# Patient Record
Sex: MALE | Race: WHITE | Employment: STUDENT | URBAN - METROPOLITAN AREA
[De-identification: names, ages, dates, MRNs, and addresses within clinical notes are randomized per-mention and may not be internally consistent; named-entity substitution may affect disease eponyms.]

---

## 2018-09-16 ENCOUNTER — HOSPITAL ENCOUNTER (EMERGENCY)
Facility: HOSPITAL | Age: 18
Discharge: HOME | End: 2018-09-16
Attending: EMERGENCY MEDICINE
Payer: OTHER MISCELLANEOUS

## 2018-09-16 VITALS
OXYGEN SATURATION: 98 % | RESPIRATION RATE: 16 BRPM | HEART RATE: 75 BPM | TEMPERATURE: 99 F | SYSTOLIC BLOOD PRESSURE: 111 MMHG | WEIGHT: 134.94 LBS | HEIGHT: 71 IN | DIASTOLIC BLOOD PRESSURE: 66 MMHG | BODY MASS INDEX: 18.89 KG/M2

## 2018-09-16 DIAGNOSIS — S61.412A LACERATION OF LEFT HAND WITHOUT FOREIGN BODY, INITIAL ENCOUNTER: Primary | ICD-10-CM

## 2018-09-16 PROCEDURE — 12002 RPR S/N/AX/GEN/TRNK2.6-7.5CM: CPT

## 2018-09-16 PROCEDURE — 99283 EMERGENCY DEPT VISIT LOW MDM: CPT

## 2018-09-16 PROCEDURE — 99282 EMERGENCY DEPT VISIT SF MDM: CPT

## 2018-09-17 NOTE — ED INITIAL ASSESSMENT (HPI)
Lac to left hand after he accidentally cut it on a clean knife at work. Works for [employer]

## 2018-09-17 NOTE — ED PROVIDER NOTES
Patient Seen in: [facility] Emergency Department    History   Patient presents with:  Laceration Abrasion (integumentary)    Stated Complaint: hand lac with knife    HPI    This is a 53-year-old male complaining of a left hand laceration that he did Muscle belly is exposed, but there is no laceration of the muscle. CMS exam is normal.  SKIN: Well perfused, without cyanosis. No rashes. NEUROLOGIC: Cranial nerves II through XII are intact moving all extremities normally.   No focal deficits visualized

## 2025-01-09 ENCOUNTER — APPOINTMENT (OUTPATIENT)
Dept: URGENT CARE | Age: 25
End: 2025-01-09

## 2025-01-09 ENCOUNTER — HOSPITAL ENCOUNTER (EMERGENCY)
Facility: HOSPITAL | Age: 25
Discharge: HOME | End: 2025-01-09
Attending: STUDENT IN AN ORGANIZED HEALTH CARE EDUCATION/TRAINING PROGRAM
Payer: COMMERCIAL

## 2025-01-09 VITALS
DIASTOLIC BLOOD PRESSURE: 59 MMHG | RESPIRATION RATE: 18 BRPM | OXYGEN SATURATION: 98 % | WEIGHT: 148 LBS | SYSTOLIC BLOOD PRESSURE: 127 MMHG | BODY MASS INDEX: 20.05 KG/M2 | HEIGHT: 72 IN | HEART RATE: 84 BPM | TEMPERATURE: 99 F

## 2025-01-09 DIAGNOSIS — S61.216A LACERATION OF RIGHT LITTLE FINGER WITHOUT FOREIGN BODY WITHOUT DAMAGE TO NAIL, INITIAL ENCOUNTER: ICD-10-CM

## 2025-01-09 DIAGNOSIS — S61.219A FINGER LACERATION INVOLVING TENDON, INITIAL ENCOUNTER: Primary | ICD-10-CM

## 2025-01-09 DIAGNOSIS — S61.210A LACERATION OF RIGHT INDEX FINGER WITHOUT FOREIGN BODY WITHOUT DAMAGE TO NAIL, INITIAL ENCOUNTER: ICD-10-CM

## 2025-01-09 DIAGNOSIS — S61.214A LACERATION OF RIGHT RING FINGER WITHOUT FOREIGN BODY WITHOUT DAMAGE TO NAIL, INITIAL ENCOUNTER: ICD-10-CM

## 2025-01-09 PROCEDURE — 12004 RPR S/N/AX/GEN/TRK7.6-12.5CM: CPT

## 2025-01-09 PROCEDURE — 99284 EMERGENCY DEPT VISIT MOD MDM: CPT

## 2025-01-09 PROCEDURE — 99283 EMERGENCY DEPT VISIT LOW MDM: CPT

## 2025-01-09 RX ORDER — LIDOCAINE HYDROCHLORIDE 10 MG/ML
1 INJECTION, SOLUTION INFILTRATION; PERINEURAL ONCE
Status: COMPLETED | OUTPATIENT
Start: 2025-01-09 | End: 2025-01-09

## 2025-01-09 RX ORDER — CEFDINIR 300 MG/1
300 CAPSULE ORAL ONCE
Status: COMPLETED | OUTPATIENT
Start: 2025-01-09 | End: 2025-01-09

## 2025-01-09 RX ORDER — ACETAMINOPHEN 500 MG
1000 TABLET ORAL ONCE
Status: COMPLETED | OUTPATIENT
Start: 2025-01-09 | End: 2025-01-09

## 2025-01-09 RX ORDER — LIDOCAINE HYDROCHLORIDE 10 MG/ML
INJECTION, SOLUTION INFILTRATION; PERINEURAL
Status: COMPLETED
Start: 2025-01-09 | End: 2025-01-09

## 2025-01-09 RX ORDER — CEFDINIR 300 MG/1
300 CAPSULE ORAL 2 TIMES DAILY
Qty: 14 CAPSULE | Refills: 0 | Status: SHIPPED | OUTPATIENT
Start: 2025-01-09 | End: 2025-01-16

## 2025-01-09 RX ORDER — CEPHALEXIN 500 MG/1
500 CAPSULE ORAL ONCE
Status: DISCONTINUED | OUTPATIENT
Start: 2025-01-09 | End: 2025-01-09

## 2025-01-09 NOTE — ED INITIAL ASSESSMENT (HPI)
Pt to ER with complaints of lac on r 2-5th fingers. Patient arrives wih dressing. Bleeding controlled. Unknown tetanus

## 2025-01-09 NOTE — ED PROVIDER NOTES
Patient Seen in: [facility] Emergency Department      History     Chief Complaint   Patient presents with    Laceration/Abrasion     Stated Complaint: laceration to hand, dressing in place, bleeding appears controlled    Subjective:   HPI    Patient is a 24-year-old male presenting the emergency department after having sustained 4 lacerations to his right hand.  Patient was using a sharp serrated bread knife to cut a piece of bread when the knife slipped injuring the palmar aspect of his second third fourth and fifth finger.  Patient unable to bend the middle finger.  No loss of sensation.    Objective:   Past Medical History:    Spontaneous pneumothorax    2015              Past Surgical History:   Procedure Laterality Date    Insertion of chest tube      12/2015 ( and removal)                Social History     Socioeconomic History    Marital status: Single   Tobacco Use    Smoking status: Never    Smokeless tobacco: Never   Substance and Sexual Activity    Drug use: Yes     Types: Cannabis              Review of Systems    Positive for stated complaint: laceration to hand, dressing in place, bleeding appears controlled  Other systems are as noted in HPI.  Constitutional and vital signs reviewed.      All other systems reviewed and negative except as noted above.    Physical Exam     ED Triage Vitals [01/09/25 1351]   /59   Pulse 84   Resp 18   Temp 98.9 °F (37.2 °C)   Temp src Temporal   SpO2 98 %   O2 Device        Current:/59   Pulse 84   Temp 98.9 °F (37.2 °C) (Temporal)   Resp 18   Ht 182.9 cm (6')   Wt 67.1 kg   SpO2 98%   BMI 20.07 kg/m²         Physical Exam    Constitutional: No apparent distress  Eyes: No scleral icterus  Respiratory: Chest wall mid moving symmetrically respirations  Extremities: Right hand with 4 lacerations to the palmar aspect of second third fourth and fifth finger.  Distally neurovascularly intact with normal sensation and capillary refill of all fingers.   Normal range of motion of digits 1,2,4 and 5.  Unable to flex the third digit.   Neuro: Alert and oriented ×3          ED Course/ My interpretations:   Labs Reviewed - No data to display      Medications given:  Orders Placed This Encounter    lidocaine (Xylocaine) 1 % injection    lidocaine (Xylocaine) 1 % injection    gelatin adsorbable (Gelfoam) 12-7 MM external sponge    DISCONTD: cephALEXin (Keflex) cap 500 mg    acetaminophen (Tylenol Extra Strength) tab 1,000 mg    cefdinir 300 MG Oral Cap    cefdinir (Omnicef) cap 300 mg                         MDM      Wound was irrigated extensively and was repaired. Patients tetanus is up to date.   Patient was educated in regards to proper wound care, timing of suture removal, signs of infection and reasons to return to the er if the wound was to worsen in any way or new concerning problems were to develop, as well as, the importance of follow-up with PCP in 3-4 days.     Procedure notes:  Right hand was irrigated extensively and saline.    Laceration repair #1     The wound was anesthetized using 1% lidocaine for digital block in the usual fashion. The wound was prepped and draped in the normal fashion.  The wound was explored for foreign bodies and none were found. The edges were reapproximated using 4 mattress sutures and 3 interrupted sutures with 4-0 Ethilon. Bleeding was well-controlled. The patient tolerated the procedure well.  Location of the wound palmar aspect of second right finger.  Length of the wound 2.5 cm.  This was a complex repair.    laceration repair #2     The wound was anesthetized using 1% lidocaine for digital block in the usual fashion. The wound was prepped and draped in the normal fashion.  The wound was explored for foreign bodies and none were found this is a flap type injury into soft tissue.  Not able to fully visualize the tendon.  Patient unable to flex this finger with concern for tendon injury.  The edges were reapproximated using 4  mattress sutures with 4-0 Ethilon. Bleeding was well-controlled. The patient tolerated the procedure well.  Location of the wound palmar aspect of the right third finger.  Length of the wound 4 cm.  This was a complex repair.    laceration repair #3     The wound was anesthetized using 1% lidocaine for digital block in the usual fashion. The wound was prepped and draped in the normal fashion.  The wound was explored for foreign bodies and none were found. The edges were reapproximated using 2 mattress sutures and 1 interrupted suture with 4-0 Ethilon. Bleeding was well-controlled. The patient tolerated the procedure well.  Location of the wound palmar aspect of fourth finger.  Length of the wound 2.5 cm.  This was a complex repair.    laceration repair #4    The wound was anesthetized using 1% lidocaine for digital block in the usual fashion. The wound was prepped and draped in the normal fashion.  The wound was explored for foreign bodies and none were found. The edges were reapproximated using 2 mattress sutures and 2 interrupted sutures. Bleeding was well-controlled. The patient tolerated the procedure well.  Location of the wound palmar aspect of fifth finger of the right hand.  Length of the wound 2.5 cm.  It was a complex repair.    Patient's allergy provide discussed with the pharmacist.  Recommended cefdinir instead of Keflex given previous hives with amoxicillin.  First dose of antibiotic ordered in the ED    Case discussed with Susan Ryan, he agreed with the emergency department management and encouraged follow-up within 1 week.  Patient advised.  Work restrictions provided.    Disposition and Plan     Clinical Impression:  1. Finger laceration involving tendon, initial encounter    2. Laceration of right index finger without foreign body without damage to nail, initial encounter    3. Laceration of right ring finger without foreign body without damage to nail, initial encounter    4. Laceration of  right little finger without foreign body without damage to nail, initial encounter         Disposition:  Discharge  1/9/2025  5:29 pm    Follow-up:  Harry Edmondson MD  [address] [address]  [phone]    Schedule an appointment as soon as possible for a visit  For wound re-check within 1 week.          Medications Prescribed:  Current Discharge Medication List        START taking these medications    Details   cefdinir 300 MG Oral Cap Take 1 capsule (300 mg total) by mouth 2 (two) times daily for 7 days.  Qty: 14 capsule, Refills: 0                 Supplementary Documentation:                                                       Documentation created with the aid of Dragon voice recognition software.  Although efforts were made to ensure the accuracy of the note, some inaccuracies may persist.

## 2025-01-10 ENCOUNTER — TELEPHONE (OUTPATIENT)
Facility: CLINIC | Age: 25
End: 2025-01-10

## 2025-01-10 DIAGNOSIS — M79.641 RIGHT HAND PAIN: Primary | ICD-10-CM

## 2025-01-10 NOTE — TELEPHONE ENCOUNTER
LVM for patient and Mychart sent as well, Patient is booked for 9:30 1/13 Monday at [facility] for ER follow up, will have xrays prior to appointment.  1st attempt

## 2025-01-13 ENCOUNTER — HOSPITAL ENCOUNTER (OUTPATIENT)
Dept: GENERAL RADIOLOGY | Age: 25
Discharge: HOME | End: 2025-01-13
Attending: STUDENT IN AN ORGANIZED HEALTH CARE EDUCATION/TRAINING PROGRAM
Payer: COMMERCIAL

## 2025-01-13 ENCOUNTER — OFFICE VISIT (OUTPATIENT)
Dept: ORTHOPEDICS CLINIC | Facility: CLINIC | Age: 25
End: 2025-01-13
Payer: COMMERCIAL

## 2025-01-13 DIAGNOSIS — M79.641 RIGHT HAND PAIN: ICD-10-CM

## 2025-01-13 DIAGNOSIS — S61.219A LACERATION OF FINGER OF RIGHT HAND WITHOUT FOREIGN BODY, NAIL DAMAGE STATUS UNSPECIFIED, UNSPECIFIED FINGER, INITIAL ENCOUNTER: Primary | ICD-10-CM

## 2025-01-13 PROCEDURE — 73130 X-RAY EXAM OF HAND: CPT | Performed by: STUDENT IN AN ORGANIZED HEALTH CARE EDUCATION/TRAINING PROGRAM

## 2025-01-13 NOTE — PROGRESS NOTES
Clinic Note     Allergies[1]    CC: right hand laceration    DOI: 1/9/25    HPI: This 24 year old RHD male presents with an injury to the right hand. The patient was using a sharp serrated bread knife to cut a piece of bread when the knife slipped, injuring the volar aspect of several digits - his index, middle, ring, and small fingers. He was seen in the emergency department on 1/9/25 and diagnosed with likely flexor tendon injuries, and then referred to see me in clinic. His pain has improved slightly since that time. He denies numbness or tingling in the digits. Denies prior surgery in this extremity. He was prescribed a course of antibiotics from the emergency department but he has not yet started taking them. He reports that he is able to bend his index, ring, and small fingers somewhat, but he is unable to bend his middle finger at all.     Pain Level: moderate to severe    Occupation: [employer]    Past Medical History:    Spontaneous pneumothorax    2015     Past Surgical History:   Procedure Laterality Date    Insertion of chest tube      12/2015 ( and removal)     Current Outpatient Medications   Medication Sig Dispense Refill    cefdinir 300 MG Oral Cap Take 1 capsule (300 mg total) by mouth 2 (two) times daily for 7 days. 14 capsule 0     Allergies[2]  No family history on file.  Social History     Occupational History    Not on file   Tobacco Use    Smoking status: Never    Smokeless tobacco: Never   Substance and Sexual Activity    Alcohol use: Not on file     Comment: cannabis-occasionallly,     Drug use: Yes     Types: Cannabis    Sexual activity: Not on file        Review of Systems:  Negative unless stated in HPI.    Physical Exam:  RUE:      - Transverse lacerations at the proximal phalanges of index, middle, ring, small fingers  - Lacerations are sutured closed with non-absorbable sutures  - No active drainage  - No concerning erythema, no clinical concern for infection    IF:  - FDS appears  intact, able to flex at PIPJ with remaining digits held in extension  - FDP appears intact but patient only has flicker of active DIPJ flexion    MF:  - FDS appears intact but patient only has flicker of active PIPJ flexion  - FDP does not appear intact, no active DIPJ flexion    RF:  - FDS is intact, able to flex at the PIP joint with the remaining digits held in extension  - FDP is intact, able to flex at the DIP joint    SF:  - FDS is intact, able to flex at the PIP joint with the remaining digits held in extension  - FDP appears intact but patient only has flicker of active DIPJ flexion    - Sensation is present to light touch at the radial aspect of all fingers  - Sensation is present to light touch at the ulnar aspect of all fingers  - [place] monofilament sensation intact to 2.83 in all digits    - Extensor mechanism appears to be intact in all digits   - ROM of all fingers are decreased due to swelling and pain    - Motor intact to AIN, PIN, Ulnar motor nerve distributions  - Skin intact, warm and well perfused with brisk capillary refill in all digits    Diagnostic Studies:  I reviewed the images independently from the professional interpretation, and discussed my interpretation of the pertinent findings with patient/family.    Xrays of right hand 3V: On my independent review of the radiographs, there are no acute bony abnormalities.    Assessment/Plan:  24 year old male with right hand laceration and likely flexor tendon injury to one or multiple digits.    I reviewed the patient's electronic medical record, including the pertinent office notes, medical/surgical history, medications and images. I specifically reviewed the images noted above, independently and discussed my independent interpretation of these images in combination with the pertinent positive and negative findings in my clinical exam with the patient    right hand laceration and likely flexor tendon injury to one or multiple  digits.    Today I discussed with Brock Portillo the pathophysiology and pathoanatomy of finger flexor tendon injury, as well as the treatment options. I explained the expected outcomes of both non-operative vs. surgical treatment. I also reviewed the indications for surgical treatment. Given the clinical assessment of likely flexor tendon laceration, for the middle finger as well as possibly other digits of the affected hand, Brock Portillo is a surgical candidate. I would plan to expose all of the wounds and assess the integrity of the flexor tendons as well as the digital nerves and arteries of each affected digit. I reviewed the surgical procedure, the risks, alternatives, post-operative course and expected outcomes of surgical management. Brock Portillo  demonstrated understanding and asked appropriate questions which I answered to his satisfaction. Following our discussion, Brock Portillo elected to proceed with surgical intervention. Unfortunately, I was told during this visit that Brock's insurance is not accepted by our facility. Therefore, Brock will not be able to undergo surgical intervention with me. I provided Brock with the names of several hand surgeons in the immediate area who do accept his insurance and I strongly suggested that he follow up with a hand surgeon with the next 3 days in order to be scheduled for urgent flexor tendon repair. He understood and all questions answered. Should he have any further issues or difficulty seeing another hand surgeon soon, I told him to reach out to our office and I will assist him in finding care.     Harry Edmondson MD   Hand, Wrist, & Elbow Surgery  [email or web address]       [1]   Allergies  Allergen Reactions    Amoxicillin      hives   [2]   Allergies  Allergen Reactions    Amoxicillin      hives

## (undated) NOTE — LETTER
Date & Time: 1/9/2025, 5:37 PM  Patient: Brock Portillo  Encounter Provider(s):    Jackie García MD       Occupational restrictions  For: Brock Portillo    Normal weight limits  No work with right hand  Normal leg activities  Normal back activities  Keep wound clean, dry and protected  -        _____________________________  Physician/APC Signature

## (undated) NOTE — ED AVS SNAPSHOT
Mr. Shabnam Short   MRN: [id number]    Department:  [facility] Emergency Department   Date of Visit:  9/16/2018           Disclosure     Insurance plans vary and the physician(s) referred by the ER may not be covered by your plan.  Please contact yo tell this physician (or your personal doctor if your instructions are to return to your personal doctor) about any new or lasting problems. The primary care or specialist physician will see patients referred from the [facility] Emergency Department.  Manpreet Cadena

## (undated) NOTE — LETTER
Date & Time: 9/19/2018, 12:09 PM  Patient: Jono Jimenez  Encounter Provider(s):    Italia Perdomo MD       To Whom It May Concern:    Blanca Schwartz was seen and treated in our department on 9/16/2018. He may return to work but keep wound covered.  No s

## (undated) NOTE — Clinical Note
January 9, 2025    Patient: Brock Portillo   Date of Visit: 1/9/2025       To Whom It May Concern:    Brock Portillo was seen and treated in our emergency department on 1/9/2025. He {Return to school/sport/work:[id number]}.    If you have any questions or concerns, please don't hesitate to call.       Encounter Provider(s):    Jackie García MD